# Patient Record
Sex: MALE | Race: WHITE | NOT HISPANIC OR LATINO | URBAN - METROPOLITAN AREA
[De-identification: names, ages, dates, MRNs, and addresses within clinical notes are randomized per-mention and may not be internally consistent; named-entity substitution may affect disease eponyms.]

---

## 2019-01-08 ENCOUNTER — EMERGENCY (EMERGENCY)
Facility: HOSPITAL | Age: 64
LOS: 1 days | Discharge: HOME | End: 2019-01-08
Attending: EMERGENCY MEDICINE | Admitting: EMERGENCY MEDICINE
Payer: COMMERCIAL

## 2019-01-08 VITALS
DIASTOLIC BLOOD PRESSURE: 82 MMHG | OXYGEN SATURATION: 99 % | TEMPERATURE: 98 F | RESPIRATION RATE: 17 BRPM | SYSTOLIC BLOOD PRESSURE: 135 MMHG | HEART RATE: 71 BPM

## 2019-01-08 VITALS
SYSTOLIC BLOOD PRESSURE: 149 MMHG | HEART RATE: 92 BPM | RESPIRATION RATE: 18 BRPM | HEIGHT: 67 IN | DIASTOLIC BLOOD PRESSURE: 88 MMHG | TEMPERATURE: 97 F | WEIGHT: 186.95 LBS | OXYGEN SATURATION: 96 %

## 2019-01-08 DIAGNOSIS — Z91.041 RADIOGRAPHIC DYE ALLERGY STATUS: ICD-10-CM

## 2019-01-08 DIAGNOSIS — R04.0 EPISTAXIS: ICD-10-CM

## 2019-01-08 DIAGNOSIS — Z91.018 ALLERGY TO OTHER FOODS: ICD-10-CM

## 2019-01-08 LAB
HCT VFR BLD CALC: 44 % — SIGNIFICANT CHANGE UP (ref 39–50)
HGB BLD-MCNC: 15.1 G/DL — SIGNIFICANT CHANGE UP (ref 13–17)
MCHC RBC-ENTMCNC: 29.8 PG — SIGNIFICANT CHANGE UP (ref 27–34)
MCHC RBC-ENTMCNC: 34.3 G/DL — SIGNIFICANT CHANGE UP (ref 32–36)
MCV RBC AUTO: 86.8 FL — SIGNIFICANT CHANGE UP (ref 80–100)
PLATELET # BLD AUTO: 295 K/UL — SIGNIFICANT CHANGE UP (ref 150–400)
RBC # BLD: 5.07 M/UL — SIGNIFICANT CHANGE UP (ref 4.2–5.8)
RBC # FLD: 12.8 % — SIGNIFICANT CHANGE UP (ref 10.3–16.9)
WBC # BLD: 10.3 K/UL — SIGNIFICANT CHANGE UP (ref 3.8–10.5)
WBC # FLD AUTO: 10.3 K/UL — SIGNIFICANT CHANGE UP (ref 3.8–10.5)

## 2019-01-08 PROCEDURE — 99283 EMERGENCY DEPT VISIT LOW MDM: CPT | Mod: 25

## 2019-01-08 PROCEDURE — 85027 COMPLETE CBC AUTOMATED: CPT

## 2019-01-08 PROCEDURE — 93010 ELECTROCARDIOGRAM REPORT: CPT | Mod: 59

## 2019-01-08 PROCEDURE — 93005 ELECTROCARDIOGRAM TRACING: CPT

## 2019-01-08 PROCEDURE — 99284 EMERGENCY DEPT VISIT MOD MDM: CPT | Mod: 25

## 2019-01-08 PROCEDURE — 36415 COLL VENOUS BLD VENIPUNCTURE: CPT

## 2019-01-08 RX ORDER — SODIUM CHLORIDE 9 MG/ML
1000 INJECTION INTRAMUSCULAR; INTRAVENOUS; SUBCUTANEOUS ONCE
Qty: 0 | Refills: 0 | Status: COMPLETED | OUTPATIENT
Start: 2019-01-08 | End: 2019-01-08

## 2019-01-08 RX ORDER — OXYMETAZOLINE HYDROCHLORIDE 0.5 MG/ML
1 SPRAY NASAL ONCE
Qty: 0 | Refills: 0 | Status: COMPLETED | OUTPATIENT
Start: 2019-01-08 | End: 2019-01-08

## 2019-01-08 RX ADMIN — SODIUM CHLORIDE 4000 MILLILITER(S): 9 INJECTION INTRAMUSCULAR; INTRAVENOUS; SUBCUTANEOUS at 15:26

## 2019-01-08 RX ADMIN — OXYMETAZOLINE HYDROCHLORIDE 1 SPRAY(S): 0.5 SPRAY NASAL at 15:25

## 2019-01-08 NOTE — ED PROVIDER NOTE - CARE PROVIDER_API CALL
Rima Mai), Otolaryngology  [address]  [address]  [place] [address]  Phone: [phone]  Fax: [phone]

## 2019-01-08 NOTE — ED PROVIDER NOTE - ATTENDING CONTRIBUTION TO CARE
patient with resolved epistaxis after intervention with afrin and atomized TXA and pressure  , hgb ok, assymptomatic, hemodynamically stable, f/u w ENT  advised d/c for now aspirin  suspect epistaxis was secondary to recent irritation from resolved 3 week URI and concomiitant asa use, suspect posterior bleed.

## 2019-01-08 NOTE — ED PROVIDER NOTE - PLAN OF CARE
If continues to bleed please continue with packing the nose with gauze and hold pressure. If bleeding does not stop return to the hospital.

## 2019-01-08 NOTE — ED PROVIDER NOTE - MEDICAL DECISION MAKING DETAILS
Patient was seen in the ED for epistaxis. Likely posterior nose bleed. Given Afrin and tranexamic acid spray with improvement in nose bleed. Dr. Salcedo spoke to patient's ENT Dr. Mai. Given Surgicel and guaze if needed. Follow-up with ENT.

## 2019-01-08 NOTE — ED PROVIDER NOTE - OBJECTIVE STATEMENT
62yo M with no significant PMHx presenting with several nose bleeds for the past day. Patient currently on two baby aspirin daily. No history of coronary artery disease. Denies any other symptoms currently. Denies chest pain palpitations or shortness of breath. 64yo M with no significant PMHx presenting with several nose bleeds for the past day. Patient currently on two baby aspirin daily. No history of coronary artery disease. Denies any other symptoms currently. Denies chest pain palpitations or shortness of breath. Recent URI.

## 2019-01-08 NOTE — CHART NOTE - NSCHARTNOTEFT_GEN_A_CORE
Called patient to let patient know that CBC showing Hg 15.1 (same hemoglobin as his outpatient labs from 1/3/18).

## 2019-01-08 NOTE — ED ADULT NURSE NOTE - CHPI ED NUR SYMPTOMS NEG
no pain/no nausea/no chills/no tingling/no decreased eating/drinking/no dizziness/no fever/no vomiting/no weakness

## 2019-01-08 NOTE — ED ADULT NURSE NOTE - NSIMPLEMENTINTERV_GEN_ALL_ED
Implemented All Universal Safety Interventions:  [place] to call system. Call bell, personal items and telephone within reach. Instruct patient to call for assistance. Room bathroom lighting operational. Non-slip footwear when patient is off stretcher. Physically safe environment: no spills, clutter or unnecessary equipment. Stretcher in lowest position, wheels locked, appropriate side rails in place.

## 2019-01-08 NOTE — ED PROVIDER NOTE - CARE PLAN
Principal Discharge DX:	Epistaxis  Goal:	If continues to bleed please continue with packing the nose with gauze and hold pressure. If bleeding does not stop return to the hospital.

## 2020-05-20 NOTE — ED PROVIDER NOTE - CONSTITUTIONAL NEGATIVE STATEMENT, MLM
Estelle Elizabeth CMA 5/20/2020 10:18 AM CDT      ----- Message -----  From: Elsy Bañuelos  Sent: 5/20/2020 10:12 AM CDT  To: , *  Subject: Medication Question     Dear Dr. García,    Want to give you an update on what is happening to me with 5 tegrotols and 1/2 bacofin tablet morning and night. I really am taking the tegrotol tablet every 4 hours apart, even if is different hours for me.    It seems to be helping although I have to really take it at the different times. Last Saturday after I started taking the 5 tablets, I got too much in my system -- and left off one tablet and that helped to get back on track. I think my body is more use to it.    Thanks and hope it continues be managable for me. Grateful to you for letting me share what is happening to me.... and hope that it will go OK now for a while -- will see. Today for the first time in a number of weeks I lead our prayer time this morning. Hope it goes OK this afternoon too.    Blessings to you and your family.    Sr. Jennifer Colindres    
no fever and no chills.
